# Patient Record
Sex: FEMALE | Race: WHITE | NOT HISPANIC OR LATINO | Employment: OTHER | URBAN - METROPOLITAN AREA
[De-identification: names, ages, dates, MRNs, and addresses within clinical notes are randomized per-mention and may not be internally consistent; named-entity substitution may affect disease eponyms.]

---

## 2019-09-30 ENCOUNTER — APPOINTMENT (EMERGENCY)
Dept: CT IMAGING | Facility: HOSPITAL | Age: 79
End: 2019-09-30
Payer: MEDICARE

## 2019-09-30 ENCOUNTER — OFFICE VISIT (OUTPATIENT)
Dept: URGENT CARE | Facility: HOSPITAL | Age: 79
End: 2019-09-30
Payer: MEDICARE

## 2019-09-30 ENCOUNTER — HOSPITAL ENCOUNTER (EMERGENCY)
Facility: HOSPITAL | Age: 79
Discharge: HOME | End: 2019-09-30
Attending: INTERNAL MEDICINE
Payer: MEDICARE

## 2019-09-30 VITALS
WEIGHT: 173 LBS | SYSTOLIC BLOOD PRESSURE: 168 MMHG | DIASTOLIC BLOOD PRESSURE: 72 MMHG | OXYGEN SATURATION: 96 % | HEART RATE: 87 BPM | BODY MASS INDEX: 33.96 KG/M2 | RESPIRATION RATE: 18 BRPM | TEMPERATURE: 98 F | HEIGHT: 60 IN

## 2019-09-30 VITALS
DIASTOLIC BLOOD PRESSURE: 97 MMHG | RESPIRATION RATE: 18 BRPM | OXYGEN SATURATION: 96 % | BODY MASS INDEX: 37.32 KG/M2 | HEART RATE: 88 BPM | SYSTOLIC BLOOD PRESSURE: 167 MMHG | TEMPERATURE: 99.2 F | WEIGHT: 173 LBS | HEIGHT: 57 IN

## 2019-09-30 DIAGNOSIS — H10.32 ACUTE BACTERIAL CONJUNCTIVITIS OF LEFT EYE: ICD-10-CM

## 2019-09-30 DIAGNOSIS — R42 DIZZINESS AND GIDDINESS: Primary | ICD-10-CM

## 2019-09-30 DIAGNOSIS — R20.2 PARESTHESIAS: Primary | ICD-10-CM

## 2019-09-30 LAB
ALBUMIN SERPL BCP-MCNC: 3.9 G/DL (ref 3.5–5.7)
ALP SERPL-CCNC: 93 U/L (ref 55–165)
ALT SERPL W P-5'-P-CCNC: 15 U/L (ref 7–52)
ANION GAP SERPL CALCULATED.3IONS-SCNC: 6 MMOL/L (ref 4–13)
APTT PPP: 33 SECONDS (ref 23–37)
AST SERPL W P-5'-P-CCNC: 24 U/L (ref 13–39)
ATRIAL RATE: 81 BPM
BACTERIA UR QL AUTO: ABNORMAL /HPF
BASOPHILS # BLD AUTO: 0.1 THOUSANDS/ΜL (ref 0–0.1)
BASOPHILS NFR BLD AUTO: 1 % (ref 0–2)
BILIRUB SERPL-MCNC: 0.4 MG/DL (ref 0.2–1)
BILIRUB UR QL STRIP: NEGATIVE
BUN SERPL-MCNC: 25 MG/DL (ref 7–25)
CALCIUM SERPL-MCNC: 10.3 MG/DL (ref 8.6–10.5)
CHLORIDE SERPL-SCNC: 103 MMOL/L (ref 98–107)
CLARITY UR: CLEAR
CO2 SERPL-SCNC: 33 MMOL/L (ref 21–31)
COLOR UR: YELLOW
CREAT SERPL-MCNC: 1.04 MG/DL (ref 0.6–1.2)
EOSINOPHIL # BLD AUTO: 0.4 THOUSAND/ΜL (ref 0–0.61)
EOSINOPHIL NFR BLD AUTO: 4 % (ref 0–5)
ERYTHROCYTE [DISTWIDTH] IN BLOOD BY AUTOMATED COUNT: 13.9 % (ref 11.5–14.5)
GFR SERPL CREATININE-BSD FRML MDRD: 52 ML/MIN/1.73SQ M
GLUCOSE SERPL-MCNC: 124 MG/DL (ref 65–99)
GLUCOSE UR STRIP-MCNC: NEGATIVE MG/DL
HCT VFR BLD AUTO: 43.3 % (ref 42–47)
HGB BLD-MCNC: 14.8 G/DL (ref 12–16)
HGB UR QL STRIP.AUTO: NEGATIVE
INR PPP: 0.91 (ref 0.9–1.5)
KETONES UR STRIP-MCNC: NEGATIVE MG/DL
LEUKOCYTE ESTERASE UR QL STRIP: ABNORMAL
LYMPHOCYTES # BLD AUTO: 2.1 THOUSANDS/ΜL (ref 0.6–4.47)
LYMPHOCYTES NFR BLD AUTO: 23 % (ref 21–51)
MCH RBC QN AUTO: 31 PG (ref 26–34)
MCHC RBC AUTO-ENTMCNC: 34.1 G/DL (ref 31–37)
MCV RBC AUTO: 91 FL (ref 81–99)
MONOCYTES # BLD AUTO: 0.8 THOUSAND/ΜL (ref 0.17–1.22)
MONOCYTES NFR BLD AUTO: 8 % (ref 2–12)
NEUTROPHILS # BLD AUTO: 5.9 THOUSANDS/ΜL (ref 1.4–6.5)
NEUTS SEG NFR BLD AUTO: 64 % (ref 42–75)
NITRITE UR QL STRIP: NEGATIVE
NON-SQ EPI CELLS URNS QL MICRO: ABNORMAL /HPF
P AXIS: 18 DEGREES
PH UR STRIP.AUTO: 6 [PH]
PLATELET # BLD AUTO: 241 THOUSANDS/UL (ref 149–390)
PMV BLD AUTO: 6.8 FL (ref 8.6–11.7)
POTASSIUM SERPL-SCNC: 5.5 MMOL/L (ref 3.5–5.5)
PR INTERVAL: 154 MS
PROT SERPL-MCNC: 6.8 G/DL (ref 6.4–8.9)
PROT UR STRIP-MCNC: NEGATIVE MG/DL
PROTHROMBIN TIME: 10.6 SECONDS (ref 10.2–13)
QRS AXIS: 31 DEGREES
QRSD INTERVAL: 78 MS
QT INTERVAL: 380 MS
QTC INTERVAL: 441 MS
RBC # BLD AUTO: 4.77 MILLION/UL (ref 3.9–5.2)
RBC #/AREA URNS AUTO: ABNORMAL /HPF
SODIUM SERPL-SCNC: 142 MMOL/L (ref 134–143)
SP GR UR STRIP.AUTO: 1.01 (ref 1–1.03)
T WAVE AXIS: 64 DEGREES
TROPONIN I SERPL-MCNC: <0.03 NG/ML
UROBILINOGEN UR QL STRIP.AUTO: 0.2 E.U./DL
VENTRICULAR RATE: 81 BPM
WBC # BLD AUTO: 9.3 THOUSAND/UL (ref 4.8–10.8)
WBC #/AREA URNS AUTO: ABNORMAL /HPF

## 2019-09-30 PROCEDURE — 81003 URINALYSIS AUTO W/O SCOPE: CPT | Performed by: INTERNAL MEDICINE

## 2019-09-30 PROCEDURE — 99203 OFFICE O/P NEW LOW 30 MIN: CPT | Performed by: EMERGENCY MEDICINE

## 2019-09-30 PROCEDURE — 85610 PROTHROMBIN TIME: CPT | Performed by: INTERNAL MEDICINE

## 2019-09-30 PROCEDURE — 85025 COMPLETE CBC W/AUTO DIFF WBC: CPT | Performed by: INTERNAL MEDICINE

## 2019-09-30 PROCEDURE — 36415 COLL VENOUS BLD VENIPUNCTURE: CPT | Performed by: INTERNAL MEDICINE

## 2019-09-30 PROCEDURE — 93005 ELECTROCARDIOGRAM TRACING: CPT

## 2019-09-30 PROCEDURE — 93010 ELECTROCARDIOGRAM REPORT: CPT | Performed by: INTERNAL MEDICINE

## 2019-09-30 PROCEDURE — 81001 URINALYSIS AUTO W/SCOPE: CPT | Performed by: INTERNAL MEDICINE

## 2019-09-30 PROCEDURE — 70450 CT HEAD/BRAIN W/O DYE: CPT

## 2019-09-30 PROCEDURE — 99284 EMERGENCY DEPT VISIT MOD MDM: CPT

## 2019-09-30 PROCEDURE — 80053 COMPREHEN METABOLIC PANEL: CPT | Performed by: INTERNAL MEDICINE

## 2019-09-30 PROCEDURE — 84484 ASSAY OF TROPONIN QUANT: CPT | Performed by: INTERNAL MEDICINE

## 2019-09-30 PROCEDURE — 85730 THROMBOPLASTIN TIME PARTIAL: CPT | Performed by: INTERNAL MEDICINE

## 2019-09-30 PROCEDURE — G0463 HOSPITAL OUTPT CLINIC VISIT: HCPCS | Performed by: EMERGENCY MEDICINE

## 2019-09-30 RX ORDER — SULFACETAMIDE SODIUM 100 MG/ML
2 SOLUTION/ DROPS OPHTHALMIC ONCE
Status: COMPLETED | OUTPATIENT
Start: 2019-09-30 | End: 2019-09-30

## 2019-09-30 RX ORDER — ASPIRIN 81 MG/1
81 TABLET, CHEWABLE ORAL DAILY
COMMUNITY
Start: 2006-11-21

## 2019-09-30 RX ORDER — SULFACETAMIDE SODIUM 100 MG/ML
1-2 SOLUTION/ DROPS OPHTHALMIC EVERY 4 HOURS
Qty: 5 ML | Refills: 0 | Status: SHIPPED | OUTPATIENT
Start: 2019-09-30

## 2019-09-30 RX ORDER — LOSARTAN POTASSIUM 100 MG/1
100 TABLET ORAL DAILY
COMMUNITY
End: 2020-04-22 | Stop reason: SDUPTHER

## 2019-09-30 RX ORDER — HYDROCHLOROTHIAZIDE 25 MG/1
1 TABLET ORAL DAILY
COMMUNITY
Start: 2018-10-31 | End: 2020-04-22 | Stop reason: SDUPTHER

## 2019-09-30 RX ORDER — OMEPRAZOLE 20 MG/1
20 CAPSULE, DELAYED RELEASE ORAL DAILY
COMMUNITY
Start: 2010-01-11 | End: 2020-04-22 | Stop reason: SDUPTHER

## 2019-09-30 RX ORDER — LOVASTATIN 10 MG/1
1 TABLET ORAL DAILY
COMMUNITY
Start: 2018-10-31 | End: 2020-04-22 | Stop reason: SDUPTHER

## 2019-09-30 RX ADMIN — SULFACETAMIDE SODIUM 2 DROP: 100 SOLUTION/ DROPS OPHTHALMIC at 22:41

## 2019-09-30 NOTE — PROGRESS NOTES
[address] [facility]        NAME: Zain Durham is a 69 y o  female  : 1940    MRN: [id number]  DATE: 2019  TIME: 7:20 PM    Assessment and Plan   Dizziness and giddiness [R42]  1  Dizziness and giddiness           Patient Instructions   Go directly to the ER for further evaluation  Follow up with PCP in 3-5 days  Proceed to  ER if symptoms worsen  Chief Complaint     Chief Complaint   Patient presents with    Earache     Left side on and off     Eye Pain     having blurry vision at times pain under left eye on cheek area    Dizziness     comes and gos when getting up more often         History of Present Illness       This is a 42-year-old female who presents with feelings of pain, numbness and a clicking sensation on the left side of her face and her left ear     Symptoms started last night  She denies any dental trauma or any recent dental visit  She has both upper and lower dentures for many years  She denies fever  She states she has had some rhinitis in the last 24 hours  But no cough  No rash  She does wear glasses for reading and states that she had difficulty reading her recipe book  today with her glasses on because of blurred vision  She states that when she touches her face on the left side lower jaw area it feels numb to her  Review of Systems   Review of Systems   Constitutional: Negative for fatigue and fever  HENT: Positive for ear pain and rhinorrhea  Negative for dental problem, ear discharge, facial swelling, postnasal drip, sinus pressure, sinus pain and sore throat  Eyes: Negative for photophobia, pain, discharge and redness  Respiratory: Negative for cough and shortness of breath  Cardiovascular: Negative for chest pain  Skin: Negative for rash           Current Medications       Current Outpatient Medications:     aspirin (ASPIRIN LOW DOSE) 81 mg chewable tablet, Chew 81 mg daily, Disp: , Rfl:     hydrochlorothiazide (HYDRODIURIL) 25 mg tablet, Take 1 tablet by mouth daily, Disp: , Rfl:     losartan (COZAAR) 100 MG tablet, Take 100 mg by mouth daily, Disp: , Rfl:     lovastatin (MEVACOR) 10 MG tablet, Take 1 tablet by mouth daily, Disp: , Rfl:     omeprazole (PRILOSEC) 20 mg delayed release capsule, Take 20 mg by mouth daily, Disp: , Rfl:     sertraline (ZOLOFT) 50 mg tablet, Take 50 mg by mouth daily, Disp: , Rfl:     Current Allergies     Allergies as of 09/30/2019 - Reviewed 09/30/2019   Allergen Reaction Noted    Ciprofloxacin  10/21/2016            The following portions of the patient's history were reviewed and updated as appropriate: allergies, current medications, past family history, past medical history, past social history, past surgical history and problem list      Past Medical History:   Diagnosis Date    Allergic     Hypertension        Past Surgical History:   Procedure Laterality Date    HERNIA REPAIR      JOINT REPLACEMENT         No family history on file  Medications have been verified  Objective   /97 (BP Location: Right arm, Patient Position: Sitting, Cuff Size: Adult)   Pulse 88   Temp 99 2 °F (37 3 °C) (Tympanic)   Resp 18   Ht 4' 9" (1 448 m)   Wt 78 5 kg (173 lb)   SpO2 96%   BMI 37 44 kg/m²        Physical Exam     Physical Exam   Constitutional: She is oriented to person, place, and time  She appears well-developed and well-nourished  HENT:   Head: Normocephalic and atraumatic  Right Ear: External ear normal    Left Ear: External ear normal    Nose: Nose normal    Mouth/Throat: Oropharynx is clear and moist    Bilateral TMs are clear  There is slight tenderness in the anterior ear canal on the left when patient opens and close this her jaw with a click  Eyes: Pupils are equal, round, and reactive to light  Conjunctivae and EOM are normal    Neck: Normal range of motion  Neck supple  No thyromegaly present  Cardiovascular: Normal rate, regular rhythm and normal heart sounds  Pulmonary/Chest: Effort normal and breath sounds normal    Musculoskeletal: Normal range of motion  Lymphadenopathy:     She has no cervical adenopathy  Neurological: She is alert and oriented to person, place, and time  No cranial nerve deficit  She exhibits normal muscle tone  Coordination normal    Patient senses a decrease in sensation to light touch on the lower left facial area  Skin: Skin is warm and dry  Psychiatric: She has a normal mood and affect  Nursing note and vitals reviewed  At the end of the interview, Pt also states that she feels dizzy today and was having trouble balancing  Also states that she has not been eating very much lately  I have asked pt to go to the Er at [address] [place] [address] for further testing  Symptoms now beyond the differential of TMJ Syndrome, Trigeminal Neuralgia, or early Herpes Zoster  Daughter will drive pt  IO have spoken with Maria L Wallace at the ER

## 2019-10-01 NOTE — DISCHARGE INSTRUCTIONS
Discussed with the patient and her daughter the findings  Recommend follow up with Ophthalmology tomorrow even by phone to see if Bleph 10 was the antibiotic that was written for  Recommend doing so before filling the prescription  Will initiate therapy at this time

## 2019-10-01 NOTE — ED PROVIDER NOTES
History  Chief Complaint   Patient presents with    Numbness     left ear pain, left eye pain, numbness in between started yesterday  hx of same  states "this happened before, it just comes and goes"    Dizziness     "only when I stand up"    Blurred Vision     started last night     60-year-old female accompanied by her daughter sent over by the urgent care for numbness on the right side of her face  Patient states this happens intermittently she also has right-sided neck pain which seems to exacerbated with movement  However the patient does have some right ear numbness and right eye tingling  She was diagnosed with a ocular infection about 10 days ago as per the patient and her daughter given eyedrops which read at the prescription without filling it  There has been no noted facial droop slurred speech hemiplegia  She has this nonspecific tingling on the right side of her face and some occasional numbness in the right ear in a row and around the eye  Patient denies fever chills or illness  When asked why she should at the prescription she states she thought she had the same prescription at home but then did not call for a new prescription  Her daughters are now taking care of her with setting up her medical appointments  She has a follow-up appointment with Cardiology this coming week as well as with Ophthalmology  Patient denies any trauma or recent injury or headache  Patient has a history of valvular heart disease with echoes done at least yearly  She has no appreciable murmur on examination and she follows up with  Cardiology  She has no history of TIA or CVA  Her daughter believes she had a carotid Doppler done within the last 6 months as well as a 2D echocardiogram           Prior to Admission Medications   Prescriptions Last Dose Informant Patient Reported?  Taking?   aspirin (ASPIRIN LOW DOSE) 81 mg chewable tablet   Yes No   Sig: Chew 81 mg daily   hydrochlorothiazide (HYDRODIURIL) 25 mg tablet   Yes No   Sig: Take 1 tablet by mouth daily   losartan (COZAAR) 100 MG tablet   Yes No   Sig: Take 100 mg by mouth daily   lovastatin (MEVACOR) 10 MG tablet   Yes No   Sig: Take 1 tablet by mouth daily   omeprazole (PRILOSEC) 20 mg delayed release capsule   Yes No   Sig: Take 20 mg by mouth daily   sertraline (ZOLOFT) 50 mg tablet   Yes No   Sig: Take 50 mg by mouth daily      Facility-Administered Medications: None       Past Medical History:   Diagnosis Date    Allergic     GERD (gastroesophageal reflux disease)     Hyperlipidemia     Hypertension        Past Surgical History:   Procedure Laterality Date    HERNIA REPAIR      JOINT REPLACEMENT      bilateral knees       History reviewed  No pertinent family history  I have reviewed and agree with the history as documented  Social History     Tobacco Use    Smoking status: Never Smoker    Smokeless tobacco: Never Used   Substance Use Topics    Alcohol use: Never     Frequency: Never    Drug use: Never        Review of Systems   Constitutional: Negative  HENT: Positive for ear pain  Negative for dental problem, drooling, ear discharge, facial swelling, postnasal drip, rhinorrhea, sinus pressure, sinus pain, sneezing, sore throat, tinnitus, trouble swallowing and voice change  Eyes: Positive for itching  Negative for photophobia, pain, discharge, redness and visual disturbance  Respiratory: Negative  Cardiovascular: Negative  Gastrointestinal: Negative  Endocrine: Negative  Genitourinary: Negative  Musculoskeletal: Negative  Skin: Negative  Neurological: Negative  Hematological: Negative  Psychiatric/Behavioral: Negative  Physical Exam  Physical Exam   Constitutional: She is oriented to person, place, and time  She appears well-developed and well-nourished  No distress  HENT:   Head: Normocephalic and atraumatic     Right Ear: External ear normal    Left Ear: External ear normal    Mouth/Throat: Oropharynx is clear and moist    Eyes: Pupils are equal, round, and reactive to light  Conjunctivae and EOM are normal    Neck: Normal range of motion  Neck supple  Cardiovascular: Normal rate, regular rhythm and normal heart sounds  Pulmonary/Chest: Effort normal and breath sounds normal    Abdominal: Soft  Bowel sounds are normal    Neurological: She is alert and oriented to person, place, and time  Skin: Skin is warm  She is not diaphoretic  Psychiatric: She has a normal mood and affect  Her behavior is normal  Thought content normal    Nursing note and vitals reviewed        Vital Signs  ED Triage Vitals   Temperature Pulse Respirations Blood Pressure SpO2   09/30/19 2020 09/30/19 2022 09/30/19 2022 09/30/19 2022 09/30/19 2022   98 °F (36 7 °C) 80 18 157/81 98 %      Temp Source Heart Rate Source Patient Position - Orthostatic VS BP Location FiO2 (%)   09/30/19 2020 -- -- -- --   Temporal          Pain Score       09/30/19 2022       7           Vitals:    09/30/19 2022 09/30/19 2100 09/30/19 2115 09/30/19 2130   BP: 157/81 (!) 193/89  168/72   Pulse: 80 86 86 87         Visual Acuity  Visual Acuity      Most Recent Value   L Pupil Size (mm)  4   R Pupil Size (mm)  4          ED Medications  Medications   sulfacetamide (BLEPH-10) 10 % ophthalmic solution 2 drop (has no administration in time range)       Diagnostic Studies  Results Reviewed     Procedure Component Value Units Date/Time    Urine Microscopic [[id number]]  (Abnormal) Collected:  09/30/19 2217    Lab Status:  Final result Specimen:  Urine, Clean Catch Updated:  09/30/19 2228     RBC, UA 0-1 /hpf      WBC, UA 2-4 /hpf      Epithelial Cells Occasional /hpf      Bacteria, UA None Seen /hpf     UA w Reflex to Microscopic w Reflex to Culture [[id number]]  (Abnormal) Collected:  09/30/19 2217    Lab Status:  Final result Specimen:  Urine, Clean Catch Updated:  09/30/19 2225     Color, UA Yellow     Clarity, UA Clear     Specific [place], UA 1 010 pH, UA 6 0     Leukocytes, UA 1+     Nitrite, UA Negative     Protein, UA Negative mg/dl      Glucose, UA Negative mg/dl      Ketones, UA Negative mg/dl      Urobilinogen, UA 0 2 E U /dl      Bilirubin, UA Negative     Blood, UA Negative    Troponin I [[id number]]  (Normal) Collected:  09/30/19 2138    Lab Status:  Final result Specimen:  Blood from Arm, Left Updated:  09/30/19 2209     Troponin I <0 03 ng/mL     Comprehensive metabolic panel [[id number]]  (Abnormal) Collected:  09/30/19 2138    Lab Status:  Final result Specimen:  Blood from Arm, Left Updated:  09/30/19 2205     Sodium 142 mmol/L      Potassium 5 5 mmol/L      Chloride 103 mmol/L      CO2 33 mmol/L      ANION GAP 6 mmol/L      BUN 25 mg/dL      Creatinine 1 04 mg/dL      Glucose 124 mg/dL      Calcium 10 3 mg/dL      AST 24 U/L      ALT 15 U/L      Alkaline Phosphatase 93 U/L      Total Protein 6 8 g/dL      Albumin 3 9 g/dL      Total Bilirubin 0 40 mg/dL      eGFR 52 ml/min/1 73sq m     Narrative:       [facility] guidelines for Chronic Kidney Disease (CKD):     Stage 1 with normal or high GFR (GFR > 90 mL/min/1 73 square meters)    Stage 2 Mild CKD (GFR = 60-89 mL/min/1 73 square meters)    Stage 3A Moderate CKD (GFR = 45-59 mL/min/1 73 square meters)    Stage 3B Moderate CKD (GFR = 30-44 mL/min/1 73 square meters)    Stage 4 Severe CKD (GFR = 15-29 mL/min/1 73 square meters)    Stage 5 End Stage CKD (GFR <15 mL/min/1 73 square meters)  Note: GFR calculation is accurate only with a steady state creatinine    Protime-INR [[id number]]  (Normal) Collected:  09/30/19 2138    Lab Status:  Final result Specimen:  Blood from Arm, Left Updated:  09/30/19 2159     Protime 10 6 seconds      INR 0 91    APTT [[id number]]  (Normal) Collected:  09/30/19 2138    Lab Status:  Final result Specimen:  Blood from Arm, Left Updated:  09/30/19 2159     PTT 33 seconds     CBC and differential [[id number]]  (Abnormal) Collected:  09/30/19 2138    Lab Status:  Final result Specimen:  Blood from Arm, Left Updated:  09/30/19 2146     WBC 9 30 Thousand/uL      RBC 4 77 Million/uL      Hemoglobin 14 8 g/dL      Hematocrit 43 3 %      MCV 91 fL      MCH 31 0 pg      MCHC 34 1 g/dL      RDW 13 9 %      MPV 6 8 fL      Platelets 942 Thousands/uL      Neutrophils Relative 64 %      Lymphocytes Relative 23 %      Monocytes Relative 8 %      Eosinophils Relative 4 %      Basophils Relative 1 %      Neutrophils Absolute 5 90 Thousands/µL      Lymphocytes Absolute 2 10 Thousands/µL      Monocytes Absolute 0 80 Thousand/µL      Eosinophils Absolute 0 40 Thousand/µL      Basophils Absolute 0 10 Thousands/µL                  CT head without contrast   ED Interpretation by Chary Colon MD (09/30 2210)      No acute intracranial abnormality  Workstation performed: [id number]         Final Result by Barbara Marks DO (09/30 2206)      No acute intracranial abnormality  Workstation performed: [id number]                    Procedures  Procedures       ED Course                               MDM    Disposition  Final diagnoses:   Paresthesias   Acute bacterial conjunctivitis of left eye     Time reflects when diagnosis was documented in both MDM as applicable and the Disposition within this note     Time User Action Codes Description Comment    9/30/2019 10:34 PM Berenice Scott Add [R20 2] Paresthesias     9/30/2019 10:35 PM Berenice Scott Add [H10 32] Acute bacterial conjunctivitis of left eye       ED Disposition     ED Disposition Condition Date/Time Comment    Discharge Stable Mon Sep 30, 2019 10:34 PM Zeyad Rodriguez discharge to home/self care  Follow-up Information     Follow up With Specialties Details Why Serafin Wade MD [facility]   [address]   [place] [address]  [phone]            Patient's Medications   Discharge Prescriptions    SULFACETAMIDE (BLEPH-10) 10 % OPHTHALMIC SOLUTION    Administer 1-2 drops into the left eye every 4 (four) hours       Start Date: 9/30/2019 End Date: --       Order Dose: 1-2 drops       Quantity: 5 mL    Refills: 0     No discharge procedures on file      ED Provider  Electronically Signed by           Mendoza Reyes MD  09/30/19 8377

## 2019-10-01 NOTE — ED NOTES
DURING TRIAGE, PT'S DAUGHTER DISCLOSED PT WAS SEEN AT EYE DOCTOR LAST WEEK, DIAGNOSED WITH SOME SORT OF EYE INFECTION, AND WAS PRESCRIBED ANTIBIOTIC EYE DROPS    PT  DID  LEESA Chavez  09/30/19 2028

## 2019-10-04 ENCOUNTER — OFFICE VISIT (OUTPATIENT)
Dept: FAMILY MEDICINE CLINIC | Facility: CLINIC | Age: 79
End: 2019-10-04
Payer: MEDICARE

## 2019-10-04 VITALS
BODY MASS INDEX: 33.79 KG/M2 | DIASTOLIC BLOOD PRESSURE: 80 MMHG | HEART RATE: 99 BPM | OXYGEN SATURATION: 97 % | WEIGHT: 173 LBS | SYSTOLIC BLOOD PRESSURE: 142 MMHG

## 2019-10-04 DIAGNOSIS — H69.82 DYSFUNCTION OF LEFT EUSTACHIAN TUBE: ICD-10-CM

## 2019-10-04 DIAGNOSIS — J32.0 CHRONIC MAXILLARY SINUSITIS: ICD-10-CM

## 2019-10-04 DIAGNOSIS — I10 ESSENTIAL HYPERTENSION: ICD-10-CM

## 2019-10-04 DIAGNOSIS — R00.2 PALPITATION: ICD-10-CM

## 2019-10-04 DIAGNOSIS — I38 VALVULAR HEART DISEASE: ICD-10-CM

## 2019-10-04 DIAGNOSIS — Z76.89 ENCOUNTER TO ESTABLISH CARE WITH NEW DOCTOR: Primary | ICD-10-CM

## 2019-10-04 DIAGNOSIS — E78.5 HYPERLIPIDEMIA, UNSPECIFIED HYPERLIPIDEMIA TYPE: ICD-10-CM

## 2019-10-04 PROCEDURE — 99204 OFFICE O/P NEW MOD 45 MIN: CPT | Performed by: INTERNAL MEDICINE

## 2019-10-04 NOTE — PROGRESS NOTES
Assessment/Plan:         Diagnoses and all orders for this visit:    Encounter to establish care with new doctor  Available records reviewed  Chronic maxillary sinusitis  Patient was start over-the-counter antihistamine without a decongestant  Dysfunction of left eustachian tube  Plan to use Flonase if symptoms would not improve  Patient would also check with her ophthalmologist if she has glaucoma  Valvular heart disease  Patient encouraged to follow up with her cardiologist   Possible echocardiogram and repeat Holter monitor will be considered if felt appropriate by cardiologist   Palpitation  EKG in the ER yesterday showed premature beats  Essential hypertension  Blood pressure stable continue current regimen  Hyperlipidemia, unspecified hyperlipidemia type  Continue with statin    Subjective:      Patient ID: Zeyad Rodriguez is a 69 y o  female  HPI  This is a pleasant 28-year-old female here to establish care  Her past medical history significant for hypertension hyperlipidemia mood disorder dyspepsia  Will would records in the chart were reviewed  Patient has seen a cardiologist [facility] about a year ago  Patient has been experience palpitation  Stress test was ordered that showed fixed defect which was attributed to possibly breast attenuation  She was given [employer] however patient did not finish it  Patient has been having palpitations often on and does get dizzy sitting or standing  Was seen in the ER 4 days ago for numbness and tingling left side of her face  Patient had troublee with speech vision focal weakness of extremities  A CT scan of the head was done to rule out stroke which was negative however, she was seen to have a polyp in the left maxillary sinus  Patient does report that she has been experiencing left ear discomfort and feels as if somebody is covering her year  She does have chronic allergies and does occasionally take antihistamine  Her P o   Intake has been good  No blood pressure medication changes recently  Patient does take her medications herself  Her 2 daughters are involved in her care  Patient had a mammogram last year that was unremarkable  She had a colonoscopy in the past that was unremarkable as well  Patient was never smoker  No family history of any kind of cancers  The following portions of the patient's history were reviewed and updated as appropriate: allergies, current medications, past family history, past medical history, past social history, past surgical history and problem list     Review of Systems   Constitutional: Negative for chills and fever  HENT:        As above   Eyes: Negative for visual disturbance  Respiratory: Negative for cough and shortness of breath  Cardiovascular: Positive for palpitations  Negative for chest pain and leg swelling (Intermittent swelling)  Gastrointestinal: Negative for abdominal pain, blood in stool, constipation and diarrhea  Genitourinary: Negative for difficulty urinating, flank pain and frequency  Musculoskeletal: Positive for arthralgias (Bilateral knee discomfort) and back pain  Neurological: Positive for dizziness  Psychiatric/Behavioral: Negative for dysphoric mood (Mood is controlled on current regimen) and sleep disturbance  Objective:      /80 (BP Location: Left arm, Patient Position: Sitting, Cuff Size: Standard)   Pulse 99   Wt 78 5 kg (173 lb)   SpO2 97%   BMI 33 79 kg/m²          Physical Exam   Constitutional: She is oriented to person, place, and time  No distress  HENT:   Mouth/Throat: Oropharynx is clear and moist    Left ear no light reflex  Positive sinus pressure bilateral  Bilateral TMJ collect   Eyes: Conjunctivae are normal    Neck: No thyromegaly present  Cardiovascular: Normal rate and regular rhythm  Murmur heard  Pulmonary/Chest: Effort normal and breath sounds normal  No stridor  No respiratory distress  She has no wheezes  Abdominal: Soft  Bowel sounds are normal  She exhibits no distension and no mass  There is no tenderness  There is no guarding  Musculoskeletal: She exhibits no edema  Lymphadenopathy:     She has no cervical adenopathy  Neurological: She is alert and oriented to person, place, and time  No cranial nerve deficit  She exhibits normal muscle tone  Good strength bilateral upper lower extremity   Psychiatric: She has a normal mood and affect   Her behavior is normal

## 2019-12-06 ENCOUNTER — OFFICE VISIT (OUTPATIENT)
Dept: FAMILY MEDICINE CLINIC | Facility: CLINIC | Age: 79
End: 2019-12-06
Payer: MEDICARE

## 2019-12-06 VITALS
DIASTOLIC BLOOD PRESSURE: 80 MMHG | OXYGEN SATURATION: 98 % | BODY MASS INDEX: 34.75 KG/M2 | HEIGHT: 60 IN | WEIGHT: 177 LBS | HEART RATE: 80 BPM | SYSTOLIC BLOOD PRESSURE: 112 MMHG

## 2019-12-06 DIAGNOSIS — J32.0 CHRONIC MAXILLARY SINUSITIS: ICD-10-CM

## 2019-12-06 DIAGNOSIS — R73.9 HYPERGLYCEMIA: ICD-10-CM

## 2019-12-06 DIAGNOSIS — I10 ESSENTIAL HYPERTENSION: Primary | ICD-10-CM

## 2019-12-06 DIAGNOSIS — E78.5 HYPERLIPIDEMIA, UNSPECIFIED HYPERLIPIDEMIA TYPE: ICD-10-CM

## 2019-12-06 DIAGNOSIS — R00.2 PALPITATION: ICD-10-CM

## 2019-12-06 PROCEDURE — 99214 OFFICE O/P EST MOD 30 MIN: CPT | Performed by: INTERNAL MEDICINE

## 2019-12-06 RX ORDER — FLUTICASONE PROPIONATE 50 MCG
2 SPRAY, SUSPENSION (ML) NASAL DAILY
Qty: 1 BOTTLE | Refills: 1 | Status: SHIPPED | OUTPATIENT
Start: 2019-12-06

## 2019-12-06 RX ORDER — FLECAINIDE ACETATE 50 MG/1
50 TABLET ORAL 2 TIMES DAILY
COMMUNITY

## 2019-12-06 NOTE — PROGRESS NOTES
Assessment/Plan:         Diagnoses and all orders for this visit:    Essential hypertension  -     TSH, 3rd generation with Free T4 reflex  -     Vitamin D 25 hydroxy  -     Urinalysis with microscopic   stable continue current regimen  Hyperlipidemia, unspecified hyperlipidemia type  -     Lipid panel    Lab studies ordered  Tolerating medication  continue  Current regimen  Palpitation  -     TSH, 3rd generation with Free T4 reflex  -     Vitamin D 25 hydroxy  -     Urinalysis with microscopic   patient  Will start flecainide  Chronic maxillary sinusitis  -     fluticasone (FLONASE) 50 mcg/act nasal spray; 2 sprays into each nostril daily  -     TSH, 3rd generation with Free T4 reflex  -     Vitamin D 25 hydroxy  -     Urinalysis with microscopic   patient encouraged to start using Flonase to help with left eustachian tube dysfunction  Hyperglycemia  -     Hemoglobin A1C  -     Microalbumin / creatinine urine ratio  -     CBC and differential  -     Comprehensive metabolic panel   diet modification  Suggested repeat lab studies  Other orders  -     flecainide (TAMBOCOR) 50 mg tablet; Take 50 mg by mouth 2 (two) times a day        Subjective:      Patient ID: Brad Fischer is a 67 y o  female  HPI   patient history of hypertension hyperlipidemia dyspepsia and depression is here to follow-up on recent echocardiogram   Echocardiogram was unremarkable  Patient had been having episodes of PVCs  Patient was seen by Cardiology and is started on flecainide that will be starting tomorrow  patient is doing quite well otherwise no episodes of presyncope  Denies any chest pain shortness of breath lightheadedness and palpitation  She has few spots on her face for which I would recommend she sees a dermatologist      These are concerning for basal cell  Patient is following regularly with her ophthalmologist as well    The following portions of the patient's history were reviewed and updated as appropriate: allergies, current medications, past family history, past medical history, past social history, past surgical history and problem list     Review of Systems   Constitutional: Negative for chills and fever  HENT: Positive for congestion and ear pain  Eyes: Negative for visual disturbance  Respiratory: Negative for cough and shortness of breath  Cardiovascular: Negative for chest pain, palpitations and leg swelling  Gastrointestinal: Negative for abdominal pain, blood in stool and constipation  Skin: Positive for rash  Allergic/Immunologic: Positive for environmental allergies  Psychiatric/Behavioral: Negative for dysphoric mood  The patient is nervous/anxious  Objective:      /80 (BP Location: Left arm, Patient Position: Sitting, Cuff Size: Standard)   Pulse 80   Ht 5' (1 524 m)   Wt 80 3 kg (177 lb)   SpO2 98%   BMI 34 57 kg/m²          Physical Exam   Constitutional: No distress  HENT:   Mouth/Throat: Oropharynx is clear and moist    Eyes: Conjunctivae are normal    Cardiovascular: Normal rate, regular rhythm and normal heart sounds  No murmur heard  Pulmonary/Chest: Effort normal and breath sounds normal  No stridor  No respiratory distress  She has no wheezes  Abdominal: Soft  Bowel sounds are normal  She exhibits no distension and no mass  There is no tenderness  There is no guarding  Musculoskeletal: She exhibits no edema  Neurological: She is alert  Skin: Rash ( rash around the right inner canthus  As a spot concerning for basal cell left maxillary arch) noted  No pallor

## 2020-01-20 ENCOUNTER — TELEPHONE (OUTPATIENT)
Dept: FAMILY MEDICINE CLINIC | Facility: CLINIC | Age: 80
End: 2020-01-20

## 2020-01-20 DIAGNOSIS — Z13.9 ENCOUNTER FOR SCREENING: Primary | ICD-10-CM

## 2020-01-20 DIAGNOSIS — R21 RASH OF FACE: ICD-10-CM

## 2020-01-20 NOTE — TELEPHONE ENCOUNTER
Called pt daughter Anival Henry lm to call office back  Orders put in for mother for eye [facility] for [address]) and derm ([facility] [address] [facility] [phone])  Per Marci Case colonoscopy not needed now and will be discussed next visit  Please advise thank you

## 2020-01-20 NOTE — TELEPHONE ENCOUNTER
For Dermatology: [facility] or [facility]   For eye [facility]   can hold off on colonoscopy till I see her again

## 2020-01-20 NOTE — TELEPHONE ENCOUNTER
Pt daughter stopped in states that mother mentioned that Smitha Cohn referred to see Rosa Salvador last time she was here  Please advise if this is correct  Daughter can be reached at number on file orders need to be put in  Daughter also request for referral to include where Smitha Cohn suggest patient should go  Thank you

## 2020-04-22 DIAGNOSIS — E78.00 PURE HYPERCHOLESTEROLEMIA: Primary | ICD-10-CM

## 2020-04-22 DIAGNOSIS — R10.13 DYSPEPSIA: ICD-10-CM

## 2020-04-22 DIAGNOSIS — I10 ESSENTIAL HYPERTENSION: ICD-10-CM

## 2020-04-22 DIAGNOSIS — F39 MOOD DISORDER (HCC): ICD-10-CM

## 2020-04-22 RX ORDER — LOSARTAN POTASSIUM 100 MG/1
100 TABLET ORAL DAILY
Qty: 90 TABLET | Refills: 1 | Status: SHIPPED | OUTPATIENT
Start: 2020-04-22

## 2020-04-22 RX ORDER — HYDROCHLOROTHIAZIDE 25 MG/1
25 TABLET ORAL DAILY
Qty: 90 TABLET | Refills: 1 | Status: SHIPPED | OUTPATIENT
Start: 2020-04-22

## 2020-04-22 RX ORDER — OMEPRAZOLE 20 MG/1
20 CAPSULE, DELAYED RELEASE ORAL DAILY
Qty: 90 CAPSULE | Refills: 1 | Status: SHIPPED | OUTPATIENT
Start: 2020-04-22

## 2020-04-22 RX ORDER — LOVASTATIN 10 MG/1
10 TABLET ORAL DAILY
Qty: 90 TABLET | Refills: 1 | Status: SHIPPED | OUTPATIENT
Start: 2020-04-22

## 2020-08-26 ENCOUNTER — TELEPHONE (OUTPATIENT)
Dept: FAMILY MEDICINE CLINIC | Facility: CLINIC | Age: 80
End: 2020-08-26

## 2020-08-26 ENCOUNTER — OFFICE VISIT (OUTPATIENT)
Dept: FAMILY MEDICINE CLINIC | Facility: CLINIC | Age: 80
End: 2020-08-26
Payer: MEDICARE

## 2020-08-26 VITALS
HEART RATE: 63 BPM | SYSTOLIC BLOOD PRESSURE: 140 MMHG | OXYGEN SATURATION: 99 % | BODY MASS INDEX: 35.73 KG/M2 | WEIGHT: 182 LBS | HEIGHT: 60 IN | TEMPERATURE: 97.3 F | DIASTOLIC BLOOD PRESSURE: 72 MMHG

## 2020-08-26 DIAGNOSIS — I10 ESSENTIAL HYPERTENSION: Primary | ICD-10-CM

## 2020-08-26 DIAGNOSIS — R73.9 HYPERGLYCEMIA: ICD-10-CM

## 2020-08-26 DIAGNOSIS — Z13.89 SCREENING FOR GENITOURINARY CONDITION: ICD-10-CM

## 2020-08-26 DIAGNOSIS — F39 MOOD DISORDER (HCC): ICD-10-CM

## 2020-08-26 DIAGNOSIS — J34.89 NASAL LESION: ICD-10-CM

## 2020-08-26 DIAGNOSIS — R00.2 PALPITATION: ICD-10-CM

## 2020-08-26 DIAGNOSIS — E78.00 PURE HYPERCHOLESTEROLEMIA: ICD-10-CM

## 2020-08-26 PROCEDURE — 3077F SYST BP >= 140 MM HG: CPT | Performed by: INTERNAL MEDICINE

## 2020-08-26 PROCEDURE — 1036F TOBACCO NON-USER: CPT | Performed by: INTERNAL MEDICINE

## 2020-08-26 PROCEDURE — 4040F PNEUMOC VAC/ADMIN/RCVD: CPT | Performed by: INTERNAL MEDICINE

## 2020-08-26 PROCEDURE — 3078F DIAST BP <80 MM HG: CPT | Performed by: INTERNAL MEDICINE

## 2020-08-26 PROCEDURE — 3008F BODY MASS INDEX DOCD: CPT | Performed by: INTERNAL MEDICINE

## 2020-08-26 PROCEDURE — 1160F RVW MEDS BY RX/DR IN RCRD: CPT | Performed by: INTERNAL MEDICINE

## 2020-08-26 PROCEDURE — 99214 OFFICE O/P EST MOD 30 MIN: CPT | Performed by: INTERNAL MEDICINE

## 2020-08-26 RX ORDER — METOPROLOL TARTRATE 100 MG/1
100 TABLET ORAL EVERY 12 HOURS SCHEDULED
COMMUNITY

## 2020-08-26 RX ORDER — AMIODARONE HYDROCHLORIDE 200 MG/1
200 TABLET ORAL DAILY
COMMUNITY

## 2020-08-26 NOTE — TELEPHONE ENCOUNTER
Daughter wants the note from today's visit faxed to her once completed  Fax to [phone]      Thanks!

## 2020-08-26 NOTE — PROGRESS NOTES
Assessment/Plan:         Diagnoses and all orders for this visit:    Essential hypertension  -     CBC and differential  -     Comprehensive metabolic panel  Stable continue current regimen  ZIO patch in place  Pure hypercholesterolemia  -     Lipid panel  Due for lipid profile tolerating statin continue current regimen  Hyperglycemia  -     Comprehensive metabolic panel  Lab studies ordered  Mood disorder (HCC)  -     Vitamin D 25 hydroxy  Continue current regimen  Palpitation  -     TSH, 3rd generation with Free T4 reflex  ZIO patch in place  Screening for genitourinary condition  -     Urinalysis with microscopic    Nasal lesion  -     Protime-INR; Future  -     APTT; Future  Plastic surgery Friday  Patient is cleared to proceed with surgery  Other orders  -     amiodarone 200 mg tablet; Take 200 mg by mouth daily  -     LISINOPRIL PO; Take by mouth  -     metoprolol tartrate (LOPRESSOR) 100 mg tablet; Take 100 mg by mouth every 12 (twelve) hours        Subjective:      Patient ID: Brennon Mendez is a 67 y o  female  HPI  Patient history of hypertension hyperlipidemia dyspepsia anxiety is here to follow-up on chronic medical problems  She tells me about her ongoing palpitation and the application of his eye patch which is applied couple of days ago  Her blood pressure is good  No recent TSH  She is on Zoloft 50 mg a day but does get anxious quite easily  She tells me she wants to be perfect but cannot always understand everything going on around her  Her arrhythmia is under control with flecainide amiodarone along with metoprolol  She is not on any anticoagulant  Has a cataract surgery coming up  She also has a right side of the nose skin lesion by plastic surgery on Friday      The following portions of the patient's history were reviewed and updated as appropriate: allergies, current medications, past family history, past medical history, past social history, past surgical history and problem list     Review of Systems   Constitutional: Negative for chills and fever  Respiratory: Negative for cough and shortness of breath  Cardiovascular: Positive for palpitations and leg swelling  Negative for chest pain  Gastrointestinal: Negative for abdominal pain, blood in stool, constipation and diarrhea  Genitourinary: Negative for difficulty urinating and dysuria  Musculoskeletal: Positive for gait problem  Skin: Positive for rash  Neurological: Negative for dizziness  Psychiatric/Behavioral: The patient is nervous/anxious  Objective:      /72 (BP Location: Left arm, Patient Position: Sitting, Cuff Size: Extra-Large)   Pulse 63   Temp (!) 97 3 °F (36 3 °C)   Ht 5' (1 524 m)   Wt 82 6 kg (182 lb)   SpO2 99%   BMI 35 54 kg/m²          Physical Exam  Constitutional:       General: She is not in acute distress  Appearance: She is well-developed  She is obese  HENT:      Head: Normocephalic  Eyes:      General: No scleral icterus  Conjunctiva/sclera: Conjunctivae normal    Neck:      Thyroid: No thyromegaly  Vascular: No carotid bruit  Cardiovascular:      Rate and Rhythm: Normal rate and regular rhythm  Heart sounds: Normal heart sounds  No murmur  Pulmonary:      Effort: Pulmonary effort is normal  No respiratory distress  Breath sounds: Normal breath sounds  No wheezing or rales  Abdominal:      General: Bowel sounds are normal  There is no distension  Palpations: There is no mass  Tenderness: There is no abdominal tenderness  There is no guarding or rebound  Hernia: No hernia is present  Musculoskeletal:      Right lower leg: No edema  Left lower leg: Edema (1+ edema ankle) present  Lymphadenopathy:      Cervical: No cervical adenopathy  Skin:     General: Skin is warm        Comments: Right side of the nose shows a lesion concerning for basal cell   Neurological:      Mental Status: She is oriented to person, place, and time  Cranial Nerves: No cranial nerve deficit  Deep Tendon Reflexes: Reflexes are normal and symmetric  Psychiatric:         Mood and Affect: Mood normal          Behavior: Behavior normal          Thought Content:  Thought content normal          Judgment: Judgment normal       Comments: Anxious

## 2020-10-14 ENCOUNTER — TELEPHONE (OUTPATIENT)
Dept: FAMILY MEDICINE CLINIC | Facility: CLINIC | Age: 80
End: 2020-10-14

## 2020-11-09 ENCOUNTER — TELEPHONE (OUTPATIENT)
Dept: FAMILY MEDICINE CLINIC | Facility: CLINIC | Age: 80
End: 2020-11-09

## 2020-11-23 ENCOUNTER — TELEPHONE (OUTPATIENT)
Dept: FAMILY MEDICINE CLINIC | Facility: CLINIC | Age: 80
End: 2020-11-23

## 2021-02-12 DIAGNOSIS — Z23 ENCOUNTER FOR IMMUNIZATION: ICD-10-CM

## 2021-03-03 ENCOUNTER — TELEPHONE (OUTPATIENT)
Dept: FAMILY MEDICINE CLINIC | Facility: CLINIC | Age: 81
End: 2021-03-03